# Patient Record
Sex: FEMALE | URBAN - METROPOLITAN AREA
[De-identification: names, ages, dates, MRNs, and addresses within clinical notes are randomized per-mention and may not be internally consistent; named-entity substitution may affect disease eponyms.]

---

## 2021-09-21 ENCOUNTER — APPOINTMENT (OUTPATIENT)
Dept: URBAN - METROPOLITAN AREA SURGERY 18 | Age: 46
Setting detail: DERMATOLOGY
End: 2021-09-23

## 2021-09-21 VITALS — TEMPERATURE: 98 F

## 2021-09-21 PROBLEM — D03.61 MELANOMA IN SITU OF RIGHT UPPER LIMB, INCLUDING SHOULDER: Status: ACTIVE | Noted: 2021-09-21

## 2021-09-21 PROCEDURE — 13121 CMPLX RPR S/A/L 2.6-7.5 CM: CPT

## 2021-09-21 PROCEDURE — OTHER REFERRAL CORRESPONDENCE: OTHER

## 2021-09-21 PROCEDURE — OTHER MOHS SURGERY WITH DEBULK SPECIMEN: OTHER

## 2021-09-21 PROCEDURE — OTHER COUNSELING: OTHER

## 2021-09-21 PROCEDURE — 17313 MOHS 1 STAGE T/A/L: CPT

## 2021-09-21 PROCEDURE — 17315 MOHS SURG ADDL BLOCK: CPT

## 2021-09-21 PROCEDURE — 17314 MOHS ADDL STAGE T/A/L: CPT

## 2021-09-21 PROCEDURE — 88342 IMHCHEM/IMCYTCHM 1ST ANTB: CPT | Mod: 59

## 2021-09-21 NOTE — PROCEDURE: MOHS SURGERY WITH DEBULK SPECIMEN
Manual pressure applied to the right femoral artery sheath insertion site.  Nasal Turnover Hinge Flap Text: The defect edges were debeveled with a #15 scalpel blade.  Given the size, depth, location of the defect and the defect being full thickness a nasal turnover hinge flap was deemed most appropriate.  Using a sterile surgical marker, an appropriate hinge flap was drawn incorporating the defect. The area thus outlined was incised with a #15 scalpel blade. The flap was designed to recreate the nasal mucosal lining and the alar rim. The skin margins were undermined to an appropriate distance in all directions utilizing iris scissors.

## 2021-10-06 ENCOUNTER — APPOINTMENT (OUTPATIENT)
Dept: URBAN - METROPOLITAN AREA SURGERY 18 | Age: 46
Setting detail: DERMATOLOGY
End: 2021-10-08

## 2021-10-06 DIAGNOSIS — Z48.817 ENCOUNTER FOR SURGICAL AFTERCARE FOLLOWING SURGERY ON THE SKIN AND SUBCUTANEOUS TISSUE: ICD-10-CM

## 2021-10-06 PROCEDURE — OTHER POST-OP WOUND CHECK: OTHER

## 2021-10-06 PROCEDURE — 99024 POSTOP FOLLOW-UP VISIT: CPT

## 2021-10-06 ASSESSMENT — LOCATION SIMPLE DESCRIPTION DERM: LOCATION SIMPLE: RIGHT SHOULDER

## 2021-10-06 ASSESSMENT — LOCATION DETAILED DESCRIPTION DERM: LOCATION DETAILED: RIGHT ANTERIOR SHOULDER

## 2021-10-06 ASSESSMENT — LOCATION ZONE DERM: LOCATION ZONE: ARM

## 2021-10-06 NOTE — PROCEDURE: POST-OP WOUND CHECK
Wound Evaluated By: Dr. Moshe Lomeli
Detail Level: Detailed
Additional Comments: Patient presents 2 weeks post Mohs surgery. The suture line is clean and well approximated. The patient. Will continue bandaging daily until crusting stops. No follow up required unless requested.
Add 76907 Cpt? (Important Note: In 2017 The Use Of 08498 Is Being Tracked By Cms To Determine Future Global Period Reimbursement For Global Periods): yes